# Patient Record
Sex: FEMALE | Race: WHITE | Employment: OTHER | URBAN - METROPOLITAN AREA
[De-identification: names, ages, dates, MRNs, and addresses within clinical notes are randomized per-mention and may not be internally consistent; named-entity substitution may affect disease eponyms.]

---

## 2022-02-07 ENCOUNTER — HOSPITAL ENCOUNTER (OUTPATIENT)
Dept: PHYSICAL THERAPY | Facility: REHABILITATION | Age: 57
End: 2022-02-07
Payer: COMMERCIAL

## 2022-02-07 DIAGNOSIS — M79.18 MYOFASCIAL PAIN: Primary | ICD-10-CM

## 2022-02-07 DIAGNOSIS — M54.2 CERVICAL PAIN: ICD-10-CM

## 2022-02-07 DIAGNOSIS — M54.6 THORACIC SPINE PAIN: ICD-10-CM

## 2022-02-07 PROCEDURE — 97140 MANUAL THERAPY 1/> REGIONS: CPT | Mod: GP | Performed by: PHYSICAL THERAPIST

## 2022-02-07 PROCEDURE — 97163 PT EVAL HIGH COMPLEX 45 MIN: CPT | Mod: GP | Performed by: PHYSICAL THERAPIST

## 2022-02-07 PROCEDURE — 97110 THERAPEUTIC EXERCISES: CPT | Mod: GP | Performed by: PHYSICAL THERAPIST

## 2022-02-24 ENCOUNTER — HOSPITAL ENCOUNTER (OUTPATIENT)
Dept: PHYSICAL THERAPY | Facility: REHABILITATION | Age: 57
End: 2022-02-24
Payer: COMMERCIAL

## 2022-02-24 DIAGNOSIS — M54.6 THORACIC SPINE PAIN: ICD-10-CM

## 2022-02-24 DIAGNOSIS — M79.18 MYOFASCIAL PAIN: Primary | ICD-10-CM

## 2022-02-24 DIAGNOSIS — M54.2 CERVICAL PAIN: ICD-10-CM

## 2022-02-24 PROCEDURE — 97110 THERAPEUTIC EXERCISES: CPT | Mod: GP | Performed by: PHYSICAL THERAPIST

## 2022-02-24 PROCEDURE — 97140 MANUAL THERAPY 1/> REGIONS: CPT | Mod: GP | Performed by: PHYSICAL THERAPIST

## 2022-02-24 PROCEDURE — 97112 NEUROMUSCULAR REEDUCATION: CPT | Mod: GP | Performed by: PHYSICAL THERAPIST

## 2022-03-25 ENCOUNTER — HOSPITAL ENCOUNTER (OUTPATIENT)
Dept: PHYSICAL THERAPY | Facility: REHABILITATION | Age: 57
Discharge: HOME | End: 2022-03-25
Payer: COMMERCIAL

## 2022-03-25 DIAGNOSIS — M79.18 MYOFASCIAL PAIN: Primary | ICD-10-CM

## 2022-03-25 DIAGNOSIS — M54.6 THORACIC SPINE PAIN: ICD-10-CM

## 2022-03-25 DIAGNOSIS — M54.2 CERVICAL PAIN: ICD-10-CM

## 2022-03-25 PROCEDURE — 97140 MANUAL THERAPY 1/> REGIONS: CPT | Mod: GP | Performed by: PHYSICAL THERAPIST

## 2022-03-25 PROCEDURE — 97112 NEUROMUSCULAR REEDUCATION: CPT | Mod: GP | Performed by: PHYSICAL THERAPIST

## 2022-03-25 PROCEDURE — 97110 THERAPEUTIC EXERCISES: CPT | Mod: GP | Performed by: PHYSICAL THERAPIST

## 2022-04-08 ENCOUNTER — HOSPITAL ENCOUNTER (OUTPATIENT)
Dept: PHYSICAL THERAPY | Facility: REHABILITATION | Age: 57
Discharge: HOME | End: 2022-04-08
Payer: COMMERCIAL

## 2022-04-08 DIAGNOSIS — M54.6 THORACIC SPINE PAIN: ICD-10-CM

## 2022-04-08 DIAGNOSIS — M54.2 CERVICAL PAIN: ICD-10-CM

## 2022-04-08 DIAGNOSIS — M79.18 MYOFASCIAL PAIN: Primary | ICD-10-CM

## 2022-04-08 PROCEDURE — 97110 THERAPEUTIC EXERCISES: CPT | Mod: GP | Performed by: PHYSICAL THERAPIST

## 2022-04-08 PROCEDURE — 97112 NEUROMUSCULAR REEDUCATION: CPT | Mod: GP | Performed by: PHYSICAL THERAPIST

## 2022-04-08 PROCEDURE — 97140 MANUAL THERAPY 1/> REGIONS: CPT | Mod: GP | Performed by: PHYSICAL THERAPIST

## 2022-04-15 ENCOUNTER — HOSPITAL ENCOUNTER (OUTPATIENT)
Dept: PHYSICAL THERAPY | Facility: REHABILITATION | Age: 57
Discharge: HOME | End: 2022-04-15
Payer: COMMERCIAL

## 2022-04-15 DIAGNOSIS — M54.2 CERVICAL PAIN: ICD-10-CM

## 2022-04-15 DIAGNOSIS — M79.18 MYOFASCIAL PAIN: Primary | ICD-10-CM

## 2022-04-15 DIAGNOSIS — M54.6 THORACIC SPINE PAIN: ICD-10-CM

## 2022-04-15 PROCEDURE — 97140 MANUAL THERAPY 1/> REGIONS: CPT | Mod: GP | Performed by: PHYSICAL THERAPIST

## 2022-04-15 PROCEDURE — 97112 NEUROMUSCULAR REEDUCATION: CPT | Mod: GP | Performed by: PHYSICAL THERAPIST

## 2022-04-15 PROCEDURE — 97110 THERAPEUTIC EXERCISES: CPT | Mod: GP | Performed by: PHYSICAL THERAPIST

## 2022-04-21 ENCOUNTER — HOSPITAL ENCOUNTER (OUTPATIENT)
Dept: PHYSICAL THERAPY | Facility: REHABILITATION | Age: 57
Discharge: HOME | End: 2022-04-21
Payer: COMMERCIAL

## 2022-04-21 DIAGNOSIS — M54.2 CERVICAL PAIN: ICD-10-CM

## 2022-04-21 DIAGNOSIS — M54.6 THORACIC SPINE PAIN: ICD-10-CM

## 2022-04-21 DIAGNOSIS — M79.18 MYOFASCIAL PAIN: Primary | ICD-10-CM

## 2022-04-21 PROCEDURE — 97112 NEUROMUSCULAR REEDUCATION: CPT | Mod: GP | Performed by: PHYSICAL THERAPIST

## 2022-04-21 PROCEDURE — 97110 THERAPEUTIC EXERCISES: CPT | Mod: GP | Performed by: PHYSICAL THERAPIST

## 2022-04-21 PROCEDURE — 97140 MANUAL THERAPY 1/> REGIONS: CPT | Mod: GP | Performed by: PHYSICAL THERAPIST

## 2022-05-12 ENCOUNTER — HOSPITAL ENCOUNTER (OUTPATIENT)
Dept: PHYSICAL THERAPY | Facility: REHABILITATION | Age: 57
Discharge: HOME | End: 2022-05-12
Payer: COMMERCIAL

## 2022-05-12 DIAGNOSIS — M54.6 THORACIC SPINE PAIN: ICD-10-CM

## 2022-05-12 DIAGNOSIS — M54.2 CERVICAL PAIN: ICD-10-CM

## 2022-05-12 DIAGNOSIS — M79.18 MYOFASCIAL PAIN: Primary | ICD-10-CM

## 2022-05-12 PROCEDURE — 97112 NEUROMUSCULAR REEDUCATION: CPT | Mod: GP | Performed by: PHYSICAL THERAPIST

## 2022-05-12 PROCEDURE — 97110 THERAPEUTIC EXERCISES: CPT | Mod: GP | Performed by: PHYSICAL THERAPIST

## 2022-05-12 PROCEDURE — 97140 MANUAL THERAPY 1/> REGIONS: CPT | Mod: GP | Performed by: PHYSICAL THERAPIST

## 2022-05-26 ENCOUNTER — HOSPITAL ENCOUNTER (OUTPATIENT)
Dept: PHYSICAL THERAPY | Facility: REHABILITATION | Age: 57
Discharge: HOME | End: 2022-05-26
Payer: COMMERCIAL

## 2022-05-26 DIAGNOSIS — M54.6 THORACIC SPINE PAIN: ICD-10-CM

## 2022-05-26 DIAGNOSIS — M79.18 MYOFASCIAL PAIN: Primary | ICD-10-CM

## 2022-05-26 DIAGNOSIS — M54.2 CERVICAL PAIN: ICD-10-CM

## 2022-05-26 PROCEDURE — 97112 NEUROMUSCULAR REEDUCATION: CPT | Mod: GP | Performed by: PHYSICAL THERAPIST

## 2022-05-26 PROCEDURE — 97140 MANUAL THERAPY 1/> REGIONS: CPT | Mod: GP | Performed by: PHYSICAL THERAPIST

## 2022-05-26 PROCEDURE — 97110 THERAPEUTIC EXERCISES: CPT | Mod: GP | Performed by: PHYSICAL THERAPIST

## 2022-06-08 NOTE — ADDENDUM NOTE
Encounter addended by: Jordan Fitzgerald, PT on: 6/8/2022 3:24 PM   Actions taken: Clinical Note Signed, Flowsheet accepted, Document created, Document edited
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
## 2022-06-10 ENCOUNTER — HOSPITAL ENCOUNTER (OUTPATIENT)
Dept: PHYSICAL THERAPY | Facility: REHABILITATION | Age: 57
Discharge: HOME | End: 2022-06-10
Payer: COMMERCIAL

## 2022-06-10 DIAGNOSIS — M54.2 CERVICAL PAIN: ICD-10-CM

## 2022-06-10 DIAGNOSIS — M54.6 THORACIC SPINE PAIN: ICD-10-CM

## 2022-06-10 DIAGNOSIS — M79.18 MYOFASCIAL PAIN: Primary | ICD-10-CM

## 2022-06-10 PROCEDURE — 97110 THERAPEUTIC EXERCISES: CPT | Mod: GP | Performed by: PHYSICAL THERAPIST

## 2022-06-10 PROCEDURE — 97140 MANUAL THERAPY 1/> REGIONS: CPT | Mod: GP | Performed by: PHYSICAL THERAPIST

## 2022-06-10 PROCEDURE — 97112 NEUROMUSCULAR REEDUCATION: CPT | Mod: GP | Performed by: PHYSICAL THERAPIST

## 2022-06-13 NOTE — PROGRESS NOTES
[facility]    OUTPATIENT PHYSICAL THERAPY  PLAN OF TREATMENT FOR OUTPATIENT REHABILITATION AND PROGRESS NOTE           Patient's Last Name, First Name, Francisca Osullivan Date of Birth  1965   Provider's Name  [facility] Medical Record No.  [id number]    Onset Date  12/9/21 Start of Care Date  2/7/22   Type:     _X_PT   ___OT   ___SLP Medical Diagnosis  Chronic pain/post concussive   PT Diagnosis  Post concussive/chronic pain Plan of Treatment  Frequency/Duration: 1x/week  Certification date from 5/7/22 to 8/6/22     Goals:  Goal Identifier 1   Goal Description Pt will be able to improve C-rot to >50 deg to improve daily function in 12 weeks.    Target Date 05/09/22   Date Met      Progress (detail required for progress note): C-rot: 62/64     Goal Identifier 2   Goal Description Pt will decrease NDI by >10 points to show improved function in 12 weeks.    Target Date 05/09/22   Date Met      Progress (detail required for progress note):       Goal Identifier 3   Goal Description Pt will decrease pain levels from 8-10/10 to 6-8/10 with ADLs to show improved function in 12 weeks.    Target Date 05/09/22   Date Met      Progress (detail required for progress note): Pain range: 6-8/10 lately     Goal Identifier     Goal Description     Target Date     Date Met      Progress (detail required for progress note):       Goal Identifier     Goal Description     Target Date     Date Met      Progress (detail required for progress note):       Goal Identifier     Goal Description     Target Date     Date Met      Progress (detail required for progress note):       Goal Identifier     Goal Description     Target Date     Date Met      Progress (detail required for progress note):       Goal Identifier     Goal Description     Target Date     Date Met      Progress  (detail required for progress note):                   I CERTIFY THE NEED FOR THESE SERVICES FURNISHED UNDER        THIS PLAN OF TREATMENT AND WHILE UNDER MY CARE .             Physician Signature               Date    X_____________________________________________________                  Referring Provider: Christal Levy, CNP    CLAUDIA ODEN, PT         06/10/22 1400   Signing Clinician's Name / Credentials   Signing clinician's name / credentials Claudia Oden PT   Session Number   Session Number 9   Adult Goals   PT Ortho Eval Goals 1;2;3   Ortho Goal 1   Goal Identifier 1   Goal Description Pt will be able to improve C-rot to >50 deg to improve daily function in 12 weeks.    Goal Progress C-rot: 62/64   Target Date 05/09/22   Ortho Goal 2   Goal Identifier 2   Goal Description Pt will decrease NDI by >10 points to show improved function in 12 weeks.    Target Date 05/09/22   Ortho Goal 3   Goal Identifier 3   Goal Description Pt will decrease pain levels from 8-10/10 to 6-8/10 with ADLs to show improved function in 12 weeks.    Goal Progress Pain range: 6-8/10 lately   Target Date 05/09/22   Subjective Report   Subjective Report She has noticed that she isn't burping like she used to after drinking. That is very exciting for her.  She does notice that she was rubbing her lower ribs/upper abdomen due to maybe having more stress with a recent client's death. She did have some coconut sugar as well which really made her not feel good.   SHe did notcie that she was able to move her neck much easier after the last treatment.   Objective Measures   Objective Measures Objective Measure 1   Objective Measure 1   Objective Measure Art, LV, neural fascial tensions.   Treatment Interventions   Interventions Therapeutic Procedure/Exercise;Neuromuscular Re-education;Manual Therapy   Therapeutic Procedure/exercise   Therapeutic Procedures: strength, endurance, ROM, flexibillity minutes (69712) 15   Skilled  Intervention AA/PROM to BLE, CTL spine, ribs, cranium   Neuromuscular Re-education   Neuromuscular re-ed of mvmt, balance, coord, kinesthetic sense, posture, proprioception minutes (56639) 15   Skilled Intervention Recip inhib of neural, LV, art fasica   Manual Therapy   Manual Therapy: Mobilization, MFR, MLD, friction massage minutes (84992) 25   Skilled Intervention Fascial release using Strain-Counterstrain of RLE-A, B cervical-A, B cervical-AST, B lumbar sulcal-LV   Plan   Plan for next session Plan to con't with manual therapy to decrease fascial tension/tone to normalize ROM/decrease inflammation/decrease mm tone and improve proprioception.     Comments   Comments She has progressed nicely thus far. Treatments have been well received and she is progressing nicely towards all goals. Today she was able to demo much improved neck sidebending post treatment. She remains appropriate for skilled PT to reach all stated goals.   Total Session Time   Timed Code Treatment Minutes 55   Total Treatment Time (sum of timed and untimed services) 55   AMBULATORY CLINICS ONLY-MEDICAL AND TREATMENT DIAGNOSIS   PT Diagnosis chronic pain, chronic neck pain

## 2022-06-24 ENCOUNTER — HOSPITAL ENCOUNTER (OUTPATIENT)
Dept: PHYSICAL THERAPY | Facility: REHABILITATION | Age: 57
Discharge: HOME | End: 2022-06-24
Payer: COMMERCIAL

## 2022-06-24 DIAGNOSIS — M79.18 MYOFASCIAL PAIN: Primary | ICD-10-CM

## 2022-06-24 DIAGNOSIS — M54.2 CERVICAL PAIN: ICD-10-CM

## 2022-06-24 DIAGNOSIS — M54.6 THORACIC SPINE PAIN: ICD-10-CM

## 2022-06-24 PROCEDURE — 97110 THERAPEUTIC EXERCISES: CPT | Mod: GP | Performed by: PHYSICAL THERAPIST

## 2022-06-24 PROCEDURE — 97112 NEUROMUSCULAR REEDUCATION: CPT | Mod: GP | Performed by: PHYSICAL THERAPIST

## 2022-06-24 PROCEDURE — 97140 MANUAL THERAPY 1/> REGIONS: CPT | Mod: GP | Performed by: PHYSICAL THERAPIST

## 2022-07-18 ENCOUNTER — HOSPITAL ENCOUNTER (OUTPATIENT)
Dept: PHYSICAL THERAPY | Facility: REHABILITATION | Age: 57
Discharge: HOME | End: 2022-07-18
Payer: COMMERCIAL

## 2022-07-18 DIAGNOSIS — M54.6 THORACIC SPINE PAIN: ICD-10-CM

## 2022-07-18 DIAGNOSIS — M54.2 CERVICAL PAIN: ICD-10-CM

## 2022-07-18 DIAGNOSIS — M79.18 MYOFASCIAL PAIN: Primary | ICD-10-CM

## 2022-07-18 PROCEDURE — 97112 NEUROMUSCULAR REEDUCATION: CPT | Mod: GP | Performed by: PHYSICAL THERAPIST

## 2022-07-18 PROCEDURE — 97140 MANUAL THERAPY 1/> REGIONS: CPT | Mod: GP | Performed by: PHYSICAL THERAPIST

## 2022-07-18 PROCEDURE — 97110 THERAPEUTIC EXERCISES: CPT | Mod: GP | Performed by: PHYSICAL THERAPIST

## 2022-08-02 ENCOUNTER — HOSPITAL ENCOUNTER (OUTPATIENT)
Dept: PHYSICAL THERAPY | Facility: REHABILITATION | Age: 57
Discharge: HOME | End: 2022-08-02
Payer: COMMERCIAL

## 2022-08-02 DIAGNOSIS — M79.18 MYOFASCIAL PAIN: Primary | ICD-10-CM

## 2022-08-02 DIAGNOSIS — M54.2 CERVICAL PAIN: ICD-10-CM

## 2022-08-02 DIAGNOSIS — M54.6 THORACIC SPINE PAIN: ICD-10-CM

## 2022-08-02 PROCEDURE — 97140 MANUAL THERAPY 1/> REGIONS: CPT | Mod: GP | Performed by: PHYSICAL THERAPIST

## 2022-08-02 PROCEDURE — 97110 THERAPEUTIC EXERCISES: CPT | Mod: GP | Performed by: PHYSICAL THERAPIST

## 2022-08-02 PROCEDURE — 97112 NEUROMUSCULAR REEDUCATION: CPT | Mod: GP | Performed by: PHYSICAL THERAPIST
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
## 2022-08-15 ENCOUNTER — HOSPITAL ENCOUNTER (OUTPATIENT)
Dept: PHYSICAL THERAPY | Facility: REHABILITATION | Age: 57
Discharge: HOME | End: 2022-08-15
Payer: COMMERCIAL

## 2022-08-15 DIAGNOSIS — M54.2 CERVICAL PAIN: ICD-10-CM

## 2022-08-15 DIAGNOSIS — M79.18 MYOFASCIAL PAIN: Primary | ICD-10-CM

## 2022-08-15 DIAGNOSIS — M54.6 THORACIC SPINE PAIN: ICD-10-CM

## 2022-08-15 PROCEDURE — 97112 NEUROMUSCULAR REEDUCATION: CPT | Mod: GP | Performed by: PHYSICAL THERAPIST

## 2022-08-15 PROCEDURE — 97140 MANUAL THERAPY 1/> REGIONS: CPT | Mod: GP | Performed by: PHYSICAL THERAPIST

## 2022-08-15 PROCEDURE — 97110 THERAPEUTIC EXERCISES: CPT | Mod: GP | Performed by: PHYSICAL THERAPIST

## 2022-08-30 ENCOUNTER — HOSPITAL ENCOUNTER (OUTPATIENT)
Dept: PHYSICAL THERAPY | Facility: REHABILITATION | Age: 57
Discharge: HOME | End: 2022-08-30
Payer: COMMERCIAL

## 2022-08-30 DIAGNOSIS — M54.6 THORACIC SPINE PAIN: ICD-10-CM

## 2022-08-30 DIAGNOSIS — M54.2 CERVICAL PAIN: ICD-10-CM

## 2022-08-30 DIAGNOSIS — M79.18 MYOFASCIAL PAIN: Primary | ICD-10-CM

## 2022-08-30 PROCEDURE — 97140 MANUAL THERAPY 1/> REGIONS: CPT | Mod: GP | Performed by: PHYSICAL THERAPIST

## 2022-08-30 PROCEDURE — 97110 THERAPEUTIC EXERCISES: CPT | Mod: GP | Performed by: PHYSICAL THERAPIST

## 2022-08-30 PROCEDURE — 97112 NEUROMUSCULAR REEDUCATION: CPT | Mod: GP | Performed by: PHYSICAL THERAPIST

## 2022-09-09 ENCOUNTER — HOSPITAL ENCOUNTER (OUTPATIENT)
Dept: PHYSICAL THERAPY | Facility: REHABILITATION | Age: 57
Discharge: HOME | End: 2022-09-09
Payer: COMMERCIAL

## 2022-09-09 DIAGNOSIS — M79.18 MYOFASCIAL PAIN: Primary | ICD-10-CM

## 2022-09-09 DIAGNOSIS — M54.2 CERVICAL PAIN: ICD-10-CM

## 2022-09-09 DIAGNOSIS — M54.6 THORACIC SPINE PAIN: ICD-10-CM

## 2022-09-09 PROCEDURE — 97140 MANUAL THERAPY 1/> REGIONS: CPT | Mod: GP | Performed by: PHYSICAL THERAPIST

## 2022-09-09 PROCEDURE — 97112 NEUROMUSCULAR REEDUCATION: CPT | Mod: GP | Performed by: PHYSICAL THERAPIST

## 2022-09-09 PROCEDURE — 97110 THERAPEUTIC EXERCISES: CPT | Mod: GP | Performed by: PHYSICAL THERAPIST

## 2022-09-29 ENCOUNTER — HOSPITAL ENCOUNTER (OUTPATIENT)
Dept: PHYSICAL THERAPY | Facility: REHABILITATION | Age: 57
Discharge: HOME | End: 2022-09-29
Payer: COMMERCIAL

## 2022-09-29 DIAGNOSIS — M79.18 MYOFASCIAL PAIN: Primary | ICD-10-CM

## 2022-09-29 DIAGNOSIS — M54.6 THORACIC SPINE PAIN: ICD-10-CM

## 2022-09-29 DIAGNOSIS — M54.2 CERVICAL PAIN: ICD-10-CM

## 2022-09-29 PROCEDURE — 97140 MANUAL THERAPY 1/> REGIONS: CPT | Mod: GP | Performed by: PHYSICAL THERAPIST

## 2022-09-29 PROCEDURE — 97110 THERAPEUTIC EXERCISES: CPT | Mod: GP | Performed by: PHYSICAL THERAPIST

## 2022-09-29 PROCEDURE — 97112 NEUROMUSCULAR REEDUCATION: CPT | Mod: GP | Performed by: PHYSICAL THERAPIST

## 2022-10-10 ENCOUNTER — HOSPITAL ENCOUNTER (OUTPATIENT)
Dept: PHYSICAL THERAPY | Facility: REHABILITATION | Age: 57
Discharge: HOME | End: 2022-10-10
Payer: COMMERCIAL

## 2022-10-10 DIAGNOSIS — M79.18 MYOFASCIAL PAIN: Primary | ICD-10-CM

## 2022-10-10 DIAGNOSIS — M54.2 CERVICAL PAIN: ICD-10-CM

## 2022-10-10 DIAGNOSIS — M54.6 THORACIC SPINE PAIN: ICD-10-CM

## 2022-10-10 PROCEDURE — 97110 THERAPEUTIC EXERCISES: CPT | Mod: GP | Performed by: PHYSICAL THERAPIST

## 2022-10-10 PROCEDURE — 97112 NEUROMUSCULAR REEDUCATION: CPT | Mod: GP | Performed by: PHYSICAL THERAPIST

## 2022-10-10 PROCEDURE — 97140 MANUAL THERAPY 1/> REGIONS: CPT | Mod: GP | Performed by: PHYSICAL THERAPIST

## 2022-10-25 ENCOUNTER — HOSPITAL ENCOUNTER (OUTPATIENT)
Dept: PHYSICAL THERAPY | Facility: REHABILITATION | Age: 57
Discharge: HOME | End: 2022-10-25
Payer: COMMERCIAL

## 2022-10-25 DIAGNOSIS — M79.18 MYOFASCIAL PAIN: Primary | ICD-10-CM

## 2022-10-25 DIAGNOSIS — M54.2 CERVICAL PAIN: ICD-10-CM

## 2022-10-25 DIAGNOSIS — M54.6 THORACIC SPINE PAIN: ICD-10-CM

## 2022-10-25 PROCEDURE — 97112 NEUROMUSCULAR REEDUCATION: CPT | Mod: GP | Performed by: PHYSICAL THERAPIST

## 2022-10-25 PROCEDURE — 97110 THERAPEUTIC EXERCISES: CPT | Mod: GP | Performed by: PHYSICAL THERAPIST

## 2022-10-25 PROCEDURE — 97140 MANUAL THERAPY 1/> REGIONS: CPT | Mod: GP | Performed by: PHYSICAL THERAPIST

## 2022-10-28 ENCOUNTER — HOSPITAL ENCOUNTER (OUTPATIENT)
Dept: PHYSICAL THERAPY | Facility: REHABILITATION | Age: 57
Discharge: HOME | End: 2022-10-28
Payer: COMMERCIAL

## 2022-10-28 DIAGNOSIS — M54.6 THORACIC SPINE PAIN: ICD-10-CM

## 2022-10-28 DIAGNOSIS — M79.18 MYOFASCIAL PAIN: Primary | ICD-10-CM

## 2022-10-28 DIAGNOSIS — M54.2 CERVICAL PAIN: ICD-10-CM

## 2022-10-28 PROCEDURE — 97140 MANUAL THERAPY 1/> REGIONS: CPT | Mod: GP | Performed by: PHYSICAL THERAPIST

## 2022-10-28 PROCEDURE — 97110 THERAPEUTIC EXERCISES: CPT | Mod: GP | Performed by: PHYSICAL THERAPIST

## 2022-10-28 NOTE — PROGRESS NOTES
[facility]   Postural Restoration Assessment    Patient Name: Francisca Batista  Date of assessment: 10/28/2022               Objective:     Note: Items left blank indicates the item was not performed or not indicated at the time of the assessment.         ROM  Date: 10/28/22      PROM in degrees PROM in degrees PROM in degrees    Right Left Right Left Right Left   Shoulder Internal Rotation 55 73                Hip External Rotation (0-50 )         Hip Internal Rotation (0-40 )                        Lower Extremity Strength:    Date: 10/28/22     LE strength/5 Right Left Right Left Right Left   Hip Flexion (L1-3) 5 5       Hip Extension (L5-S1)         Hip Abduction (L4-5)         Hip Adduction (L2-3)         Hip External Rotation         Hip Internal Rotation         Knee Extension (L3-4) 4+ 4+       Knee Flexion 4- 3+       Ankle Dorsiflexion (L4-5)         Great Toe Extension (L5)         Ankle Plantar flexion (S1)         Abdominals               Special Tests:    Special Tests Right Left        Hruska Add drop test   - +   Hruska Add lift test        Trey Extension Drop Test               Treatment Today       Date 10/28/22   Exercise    Supine 90/90 hip lift with hip shift B feet on box with cues for 3 breaths x 3 reps                                                        Savita Hernandez, PT  10/28/2022  11:19 AM

## 2022-11-10 ENCOUNTER — HOSPITAL ENCOUNTER (OUTPATIENT)
Dept: PHYSICAL THERAPY | Facility: REHABILITATION | Age: 57
Discharge: HOME | End: 2022-11-10
Payer: COMMERCIAL

## 2022-11-10 ENCOUNTER — TRANSFERRED RECORDS (OUTPATIENT)
Dept: HEALTH INFORMATION MANAGEMENT | Facility: CLINIC | Age: 57
End: 2022-11-10

## 2022-11-10 DIAGNOSIS — M79.18 MYOFASCIAL PAIN: Primary | ICD-10-CM

## 2022-11-10 DIAGNOSIS — M54.2 CERVICAL PAIN: ICD-10-CM

## 2022-11-10 DIAGNOSIS — M54.6 THORACIC SPINE PAIN: ICD-10-CM

## 2022-11-10 PROCEDURE — 97140 MANUAL THERAPY 1/> REGIONS: CPT | Mod: GP | Performed by: PHYSICAL THERAPIST

## 2022-11-10 NOTE — PROGRESS NOTES
[facility]    OUTPATIENT PHYSICAL THERAPY  PLAN OF TREATMENT FOR OUTPATIENT REHABILITATION AND PROGRESS NOTE           Patient's Last Name, First Name, Francisca Osullivan Date of Birth  1965   Provider's Name  [facility] Medical Record No.  [id number]    Onset Date  12/9/21 Start of Care Date  2/7/22   Type:     _X_PT   ___OT   ___SLP Medical Diagnosis  Chronic pain   PT Diagnosis  Chronic pain Plan of Treatment  Frequency/Duration: 1x/week  Certification date from 11/1/22 to 1/31/23     Goals:  Goal Identifier 1   Goal Description Pt will be able to improve C-rot to >50 deg to improve daily function in 12 weeks.    Target Date     Date Met      Progress (detail required for progress note): C-rot: 60/70 - GOAL MET. Will inrease to >65 deg B     Goal Identifier 2   Goal Description Pt will decrease NDI by >10 points to show improved function in 12 weeks.    Target Date     Date Met      Progress (detail required for progress note): (P) NDI: 58 on 11/10/22(8/2/22: 58) (eval 74 on 2/7/22) GOAL MET and goal will now continue with new score. .     Goal Identifier 3   Goal Description Pt will decrease pain levels from 8-10/10 to 6-8/10 with ADLs to show improved function in 12 weeks.    Target Date     Date Met      Progress (detail required for progress note): Pain range: 7-9/10 in the hips, 6-8/10 with HA but more 6's.     Goal Identifier     Goal Description     Target Date     Date Met      Progress (detail required for progress note):       Goal Identifier     Goal Description     Target Date     Date Met      Progress (detail required for progress note):       Goal Identifier     Goal Description     Target Date     Date Met      Progress (detail required for progress note):       Goal Identifier     Goal Description     Target Date     Date Met     "  Progress (detail required for progress note):       Goal Identifier     Goal Description     Target Date     Date Met      Progress (detail required for progress note):                 I CERTIFY THE NEED FOR THESE SERVICES FURNISHED UNDER        THIS PLAN OF TREATMENT AND WHILE UNDER MY CARE     (Physician co-signature of this document indicates review and certification of the therapy plan).              Referring Provider: Christal Pop, MONTY ODEN, PT         11/10/22 1000   Signing Clinician's Name / Credentials   Signing clinician's name / credentials Jordan Oden PT   Session Number   Session Number 20   Progress Note/Recertification   Recertification Due Date 01/31/23   Adult Goals   PT Ortho Eval Goals 1;2;3   Ortho Goal 1   Goal Identifier 1   Goal Description Pt will be able to improve C-rot to >50 deg to improve daily function in 12 weeks.    Goal Progress C-rot: 60/70 - GOAL MET. Will inrease to >65 deg B   Ortho Goal 2   Goal Identifier 2   Goal Description Pt will decrease NDI by >10 points to show improved function in 12 weeks.    Goal Progress NDI: 58 on 11/10/22(8/2/22: 58) (eval 74 on 2/7/22) GOAL MET and goal will now continue with new score. .   Ortho Goal 3   Goal Identifier 3   Goal Description Pt will decrease pain levels from 8-10/10 to 6-8/10 with ADLs to show improved function in 12 weeks.    Goal Progress Pain range: 7-9/10 in the hips, 6-8/10 with HA but more 6's.   Subjective Report   Subjective Report She has been tired lately but did get good sleep last night. She has been doing her HEP at home. There have been some lower leg cramps. She is still doing her NDBC testing and is going to do some more treatments/referrals. Pain range: 6-8/10 in her head which is really calmed down for her. There is still the \"dizzy\" piece that is still there. With her hip pain she does feel like her foot has been really inverted more which can give her some pain on the outside of her " foot. She has really felt like she is doing much better with PT to this point and finds it really helpful.   Objective Measures   Objective Measures Objective Measure 1   Objective Measure 1   Objective Measure LV, neural fascial tensions.   Treatment Interventions   Interventions Therapeutic Procedure/Exercise;Neuromuscular Re-education;Manual Therapy   Manual Therapy   Manual Therapy: Mobilization, MFR, MLD, friction massage minutes (61900) 55   Skilled Intervention Fascial release using Strain-Counterstrain of B eye myochain-MS, B orbit/sphenoid (P)-MS, L foot/ankle (P)-MS   Plan   Plan for next session Continue with [facility] when available. Plan to con't with manual therapy to decrease fascial tension/tone to normalize ROM/decrease inflammation/decrease mm tone and improve proprioception.   Comments   Comments She has progressed towards all goals. Overall she has done much better and is starting to progress her HEP as she is tolerating. She does continue to be appropriate for skilled PT to reach all stated goals.   Total Session Time   Timed Code Treatment Minutes 55   Total Treatment Time (sum of timed and untimed services) 55   Medicare Claim Information   PT Diagnosis chronic pain, chronic neck pain   Onset date of current episode/exacerbation 12/09/21   Certification date from 01/31/23

## 2022-11-18 ENCOUNTER — HOSPITAL ENCOUNTER (OUTPATIENT)
Dept: PHYSICAL THERAPY | Facility: REHABILITATION | Age: 57
Discharge: HOME | End: 2022-11-18
Payer: COMMERCIAL

## 2022-11-18 DIAGNOSIS — M54.2 CERVICAL PAIN: ICD-10-CM

## 2022-11-18 DIAGNOSIS — M79.18 MYOFASCIAL PAIN: Primary | ICD-10-CM

## 2022-11-18 DIAGNOSIS — M54.6 THORACIC SPINE PAIN: ICD-10-CM

## 2022-11-18 PROCEDURE — 97140 MANUAL THERAPY 1/> REGIONS: CPT | Mod: GP | Performed by: PHYSICAL THERAPIST

## 2022-11-18 PROCEDURE — 97110 THERAPEUTIC EXERCISES: CPT | Mod: GP | Performed by: PHYSICAL THERAPIST

## 2022-11-18 NOTE — PROGRESS NOTES
[facility]     * For full daily note please see Daily Doc Flow sheet    Patient Name: Francisca Batista                 Objective:     Note: Items left blank indicates the item was not performed or not indicated at the time of the assessment.         ROM  Date: 10/28/22 11/18/22     PROM in degrees PROM in degrees PROM in degrees    Right Left Right Left Right Left   Shoulder Internal Rotation 55 73 55 90               Hip External Rotation (0-50 )         Hip Internal Rotation (0-40 )                        Lower Extremity Strength:    Date: 10/28/22     LE strength/5 Right Left Right Left Right Left   Hip Flexion (L1-3) 5 5       Hip Extension (L5-S1)         Hip Abduction (L4-5)         Hip Adduction (L2-3)         Hip External Rotation         Hip Internal Rotation         Knee Extension (L3-4) 4+ 4+       Knee Flexion 4- 3+       Ankle Dorsiflexion (L4-5)         Great Toe Extension (L5)         Ankle Plantar flexion (S1)         Abdominals               Special Tests:    Special Tests Right Left        Hruska Add drop test   - +   Hruska Add lift test        Trey Extension Drop Test               Treatment Today       Date 11/18/22 10/28/22   Exercise     Supine 90/90 hip lift with hip shift B feet on box and wall with cues 3 breaths x 3 reps B feet on box with cues for 3 breaths x 3 reps   R S/L L Adductor Pullback  3  Breaths x 3 reps with cues                                                               Savita Hernandez, PT  11/18/22

## 2022-11-22 ENCOUNTER — HOSPITAL ENCOUNTER (OUTPATIENT)
Dept: PHYSICAL THERAPY | Facility: REHABILITATION | Age: 57
Discharge: HOME | End: 2022-11-22
Payer: COMMERCIAL

## 2022-11-22 DIAGNOSIS — M54.6 THORACIC SPINE PAIN: ICD-10-CM

## 2022-11-22 DIAGNOSIS — M54.2 CERVICAL PAIN: ICD-10-CM

## 2022-11-22 DIAGNOSIS — M79.18 MYOFASCIAL PAIN: Primary | ICD-10-CM

## 2022-11-22 PROCEDURE — 97140 MANUAL THERAPY 1/> REGIONS: CPT | Mod: GP | Performed by: PHYSICAL THERAPIST

## 2022-11-22 PROCEDURE — 97110 THERAPEUTIC EXERCISES: CPT | Mod: GP | Performed by: PHYSICAL THERAPIST

## 2022-12-02 ENCOUNTER — HOSPITAL ENCOUNTER (OUTPATIENT)
Dept: PHYSICAL THERAPY | Facility: REHABILITATION | Age: 57
Discharge: HOME | End: 2022-12-02
Payer: COMMERCIAL

## 2022-12-02 DIAGNOSIS — M54.2 CERVICAL PAIN: ICD-10-CM

## 2022-12-02 DIAGNOSIS — M79.18 MYOFASCIAL PAIN: Primary | ICD-10-CM

## 2022-12-02 DIAGNOSIS — M54.6 THORACIC SPINE PAIN: ICD-10-CM

## 2022-12-02 PROCEDURE — 97140 MANUAL THERAPY 1/> REGIONS: CPT | Mod: GP | Performed by: PHYSICAL THERAPIST

## 2022-12-02 PROCEDURE — 97110 THERAPEUTIC EXERCISES: CPT | Mod: GP | Performed by: PHYSICAL THERAPIST

## 2022-12-02 NOTE — PROGRESS NOTES
[facility]     * For full daily note please see Daily Doc Flow sheet    12/2/22  Patient Name: Francisca Batista                 Objective:     Note: Items left blank indicates the item was not performed or not indicated at the time of the assessment.         ROM  Date: 10/28/22 11/18/22     PROM in degrees PROM in degrees PROM in degrees    Right Left Right Left Right Left   Shoulder Internal Rotation 55 73 55 90               Hip External Rotation (0-50 )         Hip Internal Rotation (0-40 )                        Lower Extremity Strength:    Date: 10/28/22     LE strength/5 Right Left Right Left Right Left   Hip Flexion (L1-3) 5 5       Hip Extension (L5-S1)         Hip Abduction (L4-5)         Hip Adduction (L2-3)         Hip External Rotation         Hip Internal Rotation         Knee Extension (L3-4) 4+ 4+       Knee Flexion 4- 3+       Ankle Dorsiflexion (L4-5)         Great Toe Extension (L5)         Ankle Plantar flexion (S1)         Abdominals               Special Tests:    Special Tests Right Left        Hruska Add drop test   - +   Hruska Add lift test        Trey Extension Drop Test               Treatment Today       Date 12/2/22 11/18/22 10/28/22   Exercise      Supine 90/90 hip lift with hip shift B feet on box and wall with cues 3 breaths x 3 reps  B feet on box and wall with cues 3 breaths x 3 reps B feet on box with cues for 3 breaths x 3 reps   R S/L L Adductor Pullback  With yellow band 3 breaths x 5 with cues  3  Breaths x 3 reps with cues                                                                         Savita Hernandez, PT

## 2022-12-05 ENCOUNTER — HOSPITAL ENCOUNTER (OUTPATIENT)
Dept: PHYSICAL THERAPY | Facility: REHABILITATION | Age: 57
Discharge: HOME | End: 2022-12-05
Payer: COMMERCIAL

## 2022-12-05 DIAGNOSIS — M54.6 THORACIC SPINE PAIN: ICD-10-CM

## 2022-12-05 DIAGNOSIS — M79.18 MYOFASCIAL PAIN: Primary | ICD-10-CM

## 2022-12-05 DIAGNOSIS — M54.2 CERVICAL PAIN: ICD-10-CM

## 2022-12-05 PROCEDURE — 97110 THERAPEUTIC EXERCISES: CPT | Mod: GP | Performed by: PHYSICAL THERAPIST

## 2022-12-05 PROCEDURE — 97140 MANUAL THERAPY 1/> REGIONS: CPT | Mod: GP | Performed by: PHYSICAL THERAPIST

## 2022-12-19 ENCOUNTER — HOSPITAL ENCOUNTER (OUTPATIENT)
Dept: PHYSICAL THERAPY | Facility: REHABILITATION | Age: 57
Discharge: HOME | End: 2022-12-19
Payer: COMMERCIAL

## 2022-12-19 DIAGNOSIS — M79.18 MYOFASCIAL PAIN: Primary | ICD-10-CM

## 2022-12-19 DIAGNOSIS — M54.6 THORACIC SPINE PAIN: ICD-10-CM

## 2022-12-19 DIAGNOSIS — M54.2 CERVICAL PAIN: ICD-10-CM

## 2022-12-19 PROCEDURE — 97110 THERAPEUTIC EXERCISES: CPT | Mod: GP | Performed by: PHYSICAL THERAPIST

## 2022-12-19 PROCEDURE — 97140 MANUAL THERAPY 1/> REGIONS: CPT | Mod: GP | Performed by: PHYSICAL THERAPIST

## 2023-01-02 ENCOUNTER — HOSPITAL ENCOUNTER (OUTPATIENT)
Dept: PHYSICAL THERAPY | Facility: REHABILITATION | Age: 58
Discharge: HOME | End: 2023-01-02
Payer: COMMERCIAL

## 2023-01-02 DIAGNOSIS — M54.2 CERVICAL PAIN: ICD-10-CM

## 2023-01-02 DIAGNOSIS — M79.18 MYOFASCIAL PAIN: Primary | ICD-10-CM

## 2023-01-02 DIAGNOSIS — M54.6 THORACIC SPINE PAIN: ICD-10-CM

## 2023-01-02 PROCEDURE — 97140 MANUAL THERAPY 1/> REGIONS: CPT | Mod: GP | Performed by: PHYSICAL THERAPIST

## 2023-01-02 PROCEDURE — 97110 THERAPEUTIC EXERCISES: CPT | Mod: GP | Performed by: PHYSICAL THERAPIST

## 2023-01-06 ENCOUNTER — HOSPITAL ENCOUNTER (OUTPATIENT)
Dept: PHYSICAL THERAPY | Facility: REHABILITATION | Age: 58
Discharge: HOME | End: 2023-01-06
Payer: COMMERCIAL

## 2023-01-06 DIAGNOSIS — M54.6 THORACIC SPINE PAIN: ICD-10-CM

## 2023-01-06 DIAGNOSIS — M79.18 MYOFASCIAL PAIN: Primary | ICD-10-CM

## 2023-01-06 DIAGNOSIS — M54.2 CERVICAL PAIN: ICD-10-CM

## 2023-01-06 PROCEDURE — 97110 THERAPEUTIC EXERCISES: CPT | Mod: GP | Performed by: PHYSICAL THERAPIST

## 2023-01-06 PROCEDURE — 97140 MANUAL THERAPY 1/> REGIONS: CPT | Mod: GP | Performed by: PHYSICAL THERAPIST

## 2023-01-07 NOTE — PROGRESS NOTES
[facility]     * For full daily note please see Daily Doc Flow sheet    Patient Name: Francisca Batista                 Objective:     Note: Items left blank indicates the item was not performed or not indicated at the time of the assessment.         ROM  Date: 10/28/22 11/18/22     PROM in degrees PROM in degrees PROM in degrees    Right Left Right Left Right Left   Shoulder Internal Rotation 55 73 55 90               Hip External Rotation (0-50 )         Hip Internal Rotation (0-40 )                        Lower Extremity Strength:    Date: 10/28/22     LE strength/5 Right Left Right Left Right Left   Hip Flexion (L1-3) 5 5       Hip Extension (L5-S1)         Hip Abduction (L4-5)         Hip Adduction (L2-3)         Hip External Rotation         Hip Internal Rotation         Knee Extension (L3-4) 4+ 4+       Knee Flexion 4- 3+       Ankle Dorsiflexion (L4-5)         Great Toe Extension (L5)         Ankle Plantar flexion (S1)         Abdominals               Special Tests:    Special Tests Right Left        Hruska Add drop test   - +   Hruska Add lift test        Trye Extension Drop Test               Treatment Today       Date 1/6/23 12/2/22 11/18/22 10/28/22   Exercise       Supine 90/90 hip lift with hip shift B feet on box with cues 3 breaths x 3  B feet on box and wall with cues 3 breaths x 3 reps  B feet on box and wall with cues 3 breaths x 3 reps B feet on box with cues for 3 breaths x 3 reps   R S/L L Adductor Pullback  With yellow band 3 breaths x 3 with cues, then without band. Pt starting to have proper muscle facilitation without the band.  With yellow band 3 breaths x 5 with cues  3  Breaths x 3 reps with cues                                                                                   Savita Hernandez, PT

## 2023-01-30 ENCOUNTER — HOSPITAL ENCOUNTER (OUTPATIENT)
Dept: PHYSICAL THERAPY | Facility: REHABILITATION | Age: 58
Discharge: HOME | End: 2023-01-30
Payer: COMMERCIAL

## 2023-01-30 DIAGNOSIS — M54.6 THORACIC SPINE PAIN: ICD-10-CM

## 2023-01-30 DIAGNOSIS — M54.2 CERVICAL PAIN: ICD-10-CM

## 2023-01-30 DIAGNOSIS — M79.18 MYOFASCIAL PAIN: Primary | ICD-10-CM

## 2023-01-30 PROCEDURE — 97140 MANUAL THERAPY 1/> REGIONS: CPT | Mod: GP | Performed by: PHYSICAL THERAPIST
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
## 2023-02-13 ENCOUNTER — HOSPITAL ENCOUNTER (OUTPATIENT)
Dept: PHYSICAL THERAPY | Facility: REHABILITATION | Age: 58
Discharge: HOME | End: 2023-02-13
Payer: COMMERCIAL

## 2023-02-13 DIAGNOSIS — M54.6 THORACIC SPINE PAIN: ICD-10-CM

## 2023-02-13 DIAGNOSIS — M79.18 MYOFASCIAL PAIN: Primary | ICD-10-CM

## 2023-02-13 DIAGNOSIS — M54.2 CERVICAL PAIN: ICD-10-CM

## 2023-02-13 PROCEDURE — 97140 MANUAL THERAPY 1/> REGIONS: CPT | Mod: GP | Performed by: PHYSICAL THERAPIST

## 2023-02-17 ENCOUNTER — HOSPITAL ENCOUNTER (OUTPATIENT)
Dept: PHYSICAL THERAPY | Facility: REHABILITATION | Age: 58
Discharge: HOME | End: 2023-02-17
Payer: COMMERCIAL

## 2023-02-17 DIAGNOSIS — M54.2 CERVICAL PAIN: ICD-10-CM

## 2023-02-17 DIAGNOSIS — M79.18 MYOFASCIAL PAIN: Primary | ICD-10-CM

## 2023-02-17 DIAGNOSIS — M54.6 THORACIC SPINE PAIN: ICD-10-CM

## 2023-02-17 PROCEDURE — 97110 THERAPEUTIC EXERCISES: CPT | Mod: GP | Performed by: PHYSICAL THERAPIST

## 2023-02-17 PROCEDURE — 97140 MANUAL THERAPY 1/> REGIONS: CPT | Mod: GP | Performed by: PHYSICAL THERAPIST

## 2023-02-17 NOTE — PROGRESS NOTES
[facility]     * For full daily note please see Daily Doc Flow sheet    Patient Name: Francisca Batista                 Objective:     Note: Items left blank indicates the item was not performed or not indicated at the time of the assessment.         ROM  Date: 10/28/22 11/18/22     PROM in degrees PROM in degrees PROM in degrees    Right Left Right Left Right Left   Shoulder Internal Rotation 55 73 55 90               Hip External Rotation (0-50 )         Hip Internal Rotation (0-40 )                        Lower Extremity Strength:    Date: 10/28/22     LE strength/5 Right Left Right Left Right Left   Hip Flexion (L1-3) 5 5       Hip Extension (L5-S1)         Hip Abduction (L4-5)         Hip Adduction (L2-3)         Hip External Rotation         Hip Internal Rotation         Knee Extension (L3-4) 4+ 4+       Knee Flexion 4- 3+       Ankle Dorsiflexion (L4-5)         Great Toe Extension (L5)         Ankle Plantar flexion (S1)         Abdominals               Special Tests:    Special Tests Right Left        Hruska Add drop test   - +   Hruska Add lift test        Trey Extension Drop Test               Treatment Today       Date 2/17/23 1/6/23 12/2/22 11/18/22 10/28/22   Exercise        Supine 90/90 hip lift with hip shift B feet on box with cues x 3 B feet on box with cues 3 breaths x 3  B feet on box and wall with cues 3 breaths x 3 reps  B feet on box and wall with cues 3 breaths x 3 reps B feet on box with cues for 3 breaths x 3 reps   R S/L L Adductor Pullback  With yellow band 3 breaths x 3 with cues, then without band. Pt starting to have proper muscle facilitation without the band.  With yellow band 3 breaths x 3 with cues, then without band. Pt starting to have proper muscle facilitation without the band.  With yellow band 3 breaths x 5 with cues  3  Breaths x 3 reps with cues    Modified all 4 Belly Lift 3  breaths x 3 reps with cues                                                                                        Savita Hernandez, PT

## 2023-03-02 ENCOUNTER — HOSPITAL ENCOUNTER (OUTPATIENT)
Dept: PHYSICAL THERAPY | Facility: REHABILITATION | Age: 58
Discharge: HOME | End: 2023-03-02
Payer: COMMERCIAL

## 2023-03-02 DIAGNOSIS — M54.2 CERVICAL PAIN: ICD-10-CM

## 2023-03-02 DIAGNOSIS — M54.6 THORACIC SPINE PAIN: ICD-10-CM

## 2023-03-02 DIAGNOSIS — M79.18 MYOFASCIAL PAIN: Primary | ICD-10-CM

## 2023-03-02 PROCEDURE — 97110 THERAPEUTIC EXERCISES: CPT | Mod: GP | Performed by: PHYSICAL THERAPIST

## 2023-03-02 PROCEDURE — 97140 MANUAL THERAPY 1/> REGIONS: CPT | Mod: GP | Performed by: PHYSICAL THERAPIST

## 2023-03-17 ENCOUNTER — HOSPITAL ENCOUNTER (OUTPATIENT)
Dept: PHYSICAL THERAPY | Facility: REHABILITATION | Age: 58
Discharge: HOME | End: 2023-03-17
Payer: COMMERCIAL

## 2023-03-17 DIAGNOSIS — M79.18 MYOFASCIAL PAIN: Primary | ICD-10-CM

## 2023-03-17 DIAGNOSIS — M54.2 CERVICAL PAIN: ICD-10-CM

## 2023-03-17 DIAGNOSIS — M54.6 THORACIC SPINE PAIN: ICD-10-CM

## 2023-03-17 PROCEDURE — 97140 MANUAL THERAPY 1/> REGIONS: CPT | Mod: GP | Performed by: PHYSICAL THERAPIST

## 2023-03-24 ENCOUNTER — HOSPITAL ENCOUNTER (OUTPATIENT)
Dept: PHYSICAL THERAPY | Facility: REHABILITATION | Age: 58
Discharge: HOME | End: 2023-03-24
Payer: COMMERCIAL

## 2023-03-24 DIAGNOSIS — M79.18 MYOFASCIAL PAIN: Primary | ICD-10-CM

## 2023-03-24 DIAGNOSIS — M54.2 CERVICAL PAIN: ICD-10-CM

## 2023-03-24 DIAGNOSIS — M54.6 THORACIC SPINE PAIN: ICD-10-CM

## 2023-03-24 PROCEDURE — 97110 THERAPEUTIC EXERCISES: CPT | Mod: GP | Performed by: PHYSICAL THERAPIST

## 2023-03-24 PROCEDURE — 97140 MANUAL THERAPY 1/> REGIONS: CPT | Mod: GP | Performed by: PHYSICAL THERAPIST

## 2023-03-24 NOTE — PROGRESS NOTES
[facility]     * For full daily note please see Daily Doc Flow sheet    Patient Name: Francisca Batista                 Objective:     Note: Items left blank indicates the item was not performed or not indicated at the time of the assessment.         ROM  Date: 10/28/22 11/18/22     PROM in degrees PROM in degrees PROM in degrees    Right Left Right Left Right Left   Shoulder Internal Rotation 55 73 55 90               Hip External Rotation (0-50 )         Hip Internal Rotation (0-40 )                        Lower Extremity Strength:    Date: 10/28/22     LE strength/5 Right Left Right Left Right Left   Hip Flexion (L1-3) 5 5       Hip Extension (L5-S1)         Hip Abduction (L4-5)         Hip Adduction (L2-3)         Hip External Rotation         Hip Internal Rotation         Knee Extension (L3-4) 4+ 4+       Knee Flexion 4- 3+       Ankle Dorsiflexion (L4-5)         Great Toe Extension (L5)         Ankle Plantar flexion (S1)         Abdominals               Special Tests:    Special Tests Right Left        Hruska Add drop test   - +   Hruska Add lift test        Trey Extension Drop Test               Treatment Today       Date 3/24/23 2/17/23 1/6/23 12/2/22 11/18/22 10/28/22   Exercise         Supine 90/90 hip lift with hip shift B feet on box with cues x 3 B feet on box with cues x 3 B feet on box with cues 3 breaths x 3  B feet on box and wall with cues 3 breaths x 3 reps  B feet on box and wall with cues 3 breaths x 3 reps B feet on box with cues for 3 breaths x 3 reps   R S/L L Adductor Pullback   With yellow band 3 breaths x 3 with cues, then without band. Pt starting to have proper muscle facilitation without the band.  With yellow band 3 breaths x 3 with cues, then without band. Pt starting to have proper muscle facilitation without the band.  With yellow band 3 breaths x 5 with cues  3  Breaths x 3 reps with  cues    Modified all 4 Belly Lift 3 breaths x 3 with cues  Plus detailed education and discussion about breathing  3 breaths x 3 reps with cues                                                                                                 Savita Hernandez, PT

## 2023-03-27 ENCOUNTER — HOSPITAL ENCOUNTER (OUTPATIENT)
Dept: PHYSICAL THERAPY | Facility: REHABILITATION | Age: 58
Discharge: HOME | End: 2023-03-27
Payer: COMMERCIAL

## 2023-03-27 DIAGNOSIS — M54.6 THORACIC SPINE PAIN: ICD-10-CM

## 2023-03-27 DIAGNOSIS — M79.18 MYOFASCIAL PAIN: Primary | ICD-10-CM

## 2023-03-27 DIAGNOSIS — M54.2 CERVICAL PAIN: ICD-10-CM

## 2023-03-27 PROCEDURE — 97140 MANUAL THERAPY 1/> REGIONS: CPT | Mod: GP | Performed by: PHYSICAL THERAPIST

## 2023-04-10 ENCOUNTER — HOSPITAL ENCOUNTER (OUTPATIENT)
Dept: PHYSICAL THERAPY | Facility: REHABILITATION | Age: 58
Discharge: HOME | End: 2023-04-10
Payer: COMMERCIAL

## 2023-04-10 DIAGNOSIS — M54.6 THORACIC SPINE PAIN: ICD-10-CM

## 2023-04-10 DIAGNOSIS — M54.2 CERVICAL PAIN: ICD-10-CM

## 2023-04-10 DIAGNOSIS — M79.18 MYOFASCIAL PAIN: Primary | ICD-10-CM

## 2023-04-10 PROCEDURE — 97140 MANUAL THERAPY 1/> REGIONS: CPT | Mod: GP | Performed by: PHYSICAL THERAPIST

## 2023-04-15 ENCOUNTER — HEALTH MAINTENANCE LETTER (OUTPATIENT)
Age: 58
End: 2023-04-15

## 2023-04-21 ENCOUNTER — HOSPITAL ENCOUNTER (OUTPATIENT)
Dept: PHYSICAL THERAPY | Facility: REHABILITATION | Age: 58
Discharge: HOME | End: 2023-04-21
Payer: COMMERCIAL

## 2023-04-21 DIAGNOSIS — M79.18 MYOFASCIAL PAIN: Primary | ICD-10-CM

## 2023-04-21 DIAGNOSIS — M54.2 CERVICAL PAIN: ICD-10-CM

## 2023-04-21 DIAGNOSIS — M54.6 THORACIC SPINE PAIN: ICD-10-CM

## 2023-04-21 PROCEDURE — 97140 MANUAL THERAPY 1/> REGIONS: CPT | Mod: GP | Performed by: PHYSICAL THERAPIST

## 2023-04-21 PROCEDURE — 97110 THERAPEUTIC EXERCISES: CPT | Mod: GP | Performed by: PHYSICAL THERAPIST

## 2023-04-21 NOTE — PROGRESS NOTES
[facility]    OUTPATIENT PHYSICAL THERAPY  PLAN OF TREATMENT FOR OUTPATIENT REHABILITATION AND PROGRESS NOTE           Patient's Last Name, First Name, Francisca Osullivan Date of Birth  1965   Provider's Name  [facility] Medical Record No.  [id number]    Onset Date  12/9/21 Start of Care Date  2/7/22   Type:     _X_PT   ___OT   ___SLP Medical Diagnosis  postconcussive syndrome, chronic pain   PT Diagnosis  Chronic neck and back Pain, decreased strength  Plan of Treatment  Frequency/Duration: 1-2 times per week x 12 more weeks.  Certification date from 4/21/23 to 7/18/23     Goals:  Goal Identifier 1   Goal Description Pt will be able to improve C-rot to >65 deg to improve daily function in 12 weeks.   Target Date     Date Met      Progress (detail required for progress note): C-rot: 63/65 - GOAL MET. Will inrease to >65 deg B     Goal Identifier 2   Goal Description Pt will decrease NDI by >10 points to show improved function in 12 weeks.    Target Date     Date Met   8/2/22   Progress (detail required for progress note): NDI: 8/2/22: 58 (eval 74 on 2/7/22) GOAL MET and goal will now continue with new score. .     Goal Identifier 3   Goal Description Pt will decrease pain levels from 8-10/10 to 6-8/10 with ADLs to show improved function in 12 weeks.    Target Date  7/18/23   Date Met      Progress (detail required for progress note): Pain range: 5-7/10 in the body, 6-9/10 with HA but more 6's.  Pt is progressing.  Continue goal.     Goal Identifier  4 New Goal:   Goal Description  Pt will be able to increase her ability to perform household tasks like making meals without pain limiting her in 12 weeks.     Target Date  7/18/23   Date Met      Progress (detail required for progress note):       Goal Identifier     Goal Description     Target Date     Date Met       Progress (detail required for progress note):       Goal Identifier     Goal Description     Target Date     Date Met      Progress (detail required for progress note):       Goal Identifier     Goal Description     Target Date     Date Met      Progress (detail required for progress note):       Goal Identifier     Goal Description     Target Date     Date Met      Progress (detail required for progress note):             Beginning/End Dates of Progress Note Reporting Period:  1/31/23 to 4/21/23    Progress Toward Goals:   Progress this reporting period: Francisca is making nice progress objectively with increased cervical rotation and side bending range of motion as well as shoulder IR PROM.  She also is feeling stronger with her exercises and demonstrating better ability to perform them and facilitate the proper muscles.      Client Self (Subjective) Report for Progress Note Reporting Period: Pt reports she feels she is doing better with her Postural Restoration PT and that she now has the right glasses.  She states it has been a proecess of figuring out her glasses.  She also reports stress with an upcoming MRI for her brain because of her severe response to noise of the machine.      Objective Measurements:   Objective Measure: MS, neural fascial tensions.     Objective Measure: Shoulder PROM IR  Details: R 70 degrees, L 80 degrees, B increased to 90 after FARAZ manual techniqes today.                                    I CERTIFY THE NEED FOR THESE SERVICES FURNISHED UNDER        THIS PLAN OF TREATMENT AND WHILE UNDER MY CARE     (Physician co-signature of this document indicates review and certification of the therapy plan).                Referring Provider: Nidhi Hernandez, PT     04/21/23 1100   Signing Clinician's Name / Credentials   Signing clinician's name / credentials Savita Sarah PT   Session Number   Session Number 34   Progress Note/Recertification    Recertification Due Date 04/30/23   Adult Goals   PT Ortho Eval Goals 1;2;3   Ortho Goal 1   Goal Identifier 1   Goal Description Pt will be able to improve C-rot to >65 deg to improve daily function in 12 weeks.   Goal Progress C-rot: 63/65 - GOAL MET. Will inrease to >65 deg B   Ortho Goal 2   Goal Identifier 2   Goal Description Pt will decrease NDI by >10 points to show improved function in 12 weeks.    Goal Progress NDI: 8/2/22: 58 (eval 74 on 2/7/22) GOAL MET and goal will now continue with new score. .   Ortho Goal 3   Goal Identifier 3   Goal Description Pt will decrease pain levels from 8-10/10 to 6-8/10 with ADLs to show improved function in 12 weeks.    Goal Progress Pain range: 5-7/10 in the body, 6-9/10 with HA but more 6's.   Subjective Report   Subjective Report Pt reports she feels she is doing better with her Postural Restoration PT and that she now has the right glasses.  She states it has been a proecess of figuring out her glasses.  She also reports stress with an upcoming MRI for her brain because of her severe response to noise of the machine.   Objective Measures   Objective Measures Objective Measure 1   Objective Measure 1   Objective Measure MS, neural fascial tensions.   Objective Measure 2   Objective Measure Shoulder PROM IR   Details R 70 degrees, L 80 degrees, B increased to 90 after FARAZ manual techniqes today.   Treatment Interventions   Interventions Therapeutic Procedure/Exercise;Neuromuscular Re-education;Manual Therapy   Therapeutic Procedure/exercise   Therapeutic Procedures: strength, endurance, ROM, flexibillity minutes ([id number]) 30   Skilled Intervention FARAZ exercises   Patient Response Pt felt good doing the exercise today. No negative resonse.   Treatment Detail See Note for exercise flow sheet   Progress Required less cues   Manual Therapy   Manual Therapy: Mobilization, MFR, MLD, friction massage minutes (68181) 15   Skilled Intervention FARAZ Manual Techniques   Patient  Response Complete restoration of B shoulder PROM IR, Easier to breathe   Treatment Detail L AIC, Infraclavicular Pump, Lower Rib Depression   Progress Upper ribs are not up as much, better chest wall flexibility   Plan   Plan for next session Plan to continue with FARAZ progress and  manual therapy techniques  to reposition chest wall and get her ribs down to get her diaphragm in the proper position as well as decrease fascial tension/tone to normalize ROM/decrease inflammation/decrease mm tone and improve proprioception.   Total Session Time   Timed Code Treatment Minutes 30, 15   Total Treatment Time (sum of timed and untimed services) 45   Medicare Claim Information   PT Diagnosis chronic pain, chronic neck pain   Onset date of current episode/exacerbation 12/09/21   Certification date from 01/31/23

## 2023-05-08 ENCOUNTER — HOSPITAL ENCOUNTER (OUTPATIENT)
Dept: PHYSICAL THERAPY | Facility: REHABILITATION | Age: 58
Discharge: HOME | End: 2023-05-08
Payer: COMMERCIAL

## 2023-05-08 DIAGNOSIS — M54.2 CERVICAL PAIN: ICD-10-CM

## 2023-05-08 DIAGNOSIS — M54.6 THORACIC SPINE PAIN: ICD-10-CM

## 2023-05-08 DIAGNOSIS — M79.18 MYOFASCIAL PAIN: Primary | ICD-10-CM

## 2023-05-08 PROCEDURE — 97140 MANUAL THERAPY 1/> REGIONS: CPT | Mod: GP | Performed by: PHYSICAL THERAPIST

## 2023-05-22 ENCOUNTER — THERAPY VISIT (OUTPATIENT)
Dept: PHYSICAL THERAPY | Facility: REHABILITATION | Age: 58
End: 2023-05-22
Payer: COMMERCIAL

## 2023-05-22 DIAGNOSIS — M54.6 THORACIC SPINE PAIN: ICD-10-CM

## 2023-05-22 DIAGNOSIS — M54.2 CERVICAL PAIN: ICD-10-CM

## 2023-05-22 DIAGNOSIS — M79.18 MYOFASCIAL PAIN: Primary | ICD-10-CM

## 2023-05-22 PROCEDURE — 97140 MANUAL THERAPY 1/> REGIONS: CPT | Mod: GP | Performed by: PHYSICAL THERAPIST

## 2023-05-25 ENCOUNTER — THERAPY VISIT (OUTPATIENT)
Dept: PHYSICAL THERAPY | Facility: REHABILITATION | Age: 58
End: 2023-05-25
Payer: COMMERCIAL

## 2023-05-25 DIAGNOSIS — M54.2 CERVICAL PAIN: ICD-10-CM

## 2023-05-25 DIAGNOSIS — M54.6 THORACIC SPINE PAIN: ICD-10-CM

## 2023-05-25 DIAGNOSIS — M79.18 MYOFASCIAL PAIN: Primary | ICD-10-CM

## 2023-05-25 PROCEDURE — 97140 MANUAL THERAPY 1/> REGIONS: CPT | Mod: GP | Performed by: PHYSICAL THERAPIST

## 2023-05-25 PROCEDURE — 97110 THERAPEUTIC EXERCISES: CPT | Mod: GP | Performed by: PHYSICAL THERAPIST

## 2023-06-05 ENCOUNTER — THERAPY VISIT (OUTPATIENT)
Dept: PHYSICAL THERAPY | Facility: REHABILITATION | Age: 58
End: 2023-06-05
Payer: COMMERCIAL

## 2023-06-05 DIAGNOSIS — M54.2 CERVICAL PAIN: ICD-10-CM

## 2023-06-05 DIAGNOSIS — M79.18 MYOFASCIAL PAIN: Primary | ICD-10-CM

## 2023-06-05 DIAGNOSIS — M54.6 THORACIC SPINE PAIN: ICD-10-CM

## 2023-06-05 PROCEDURE — 97140 MANUAL THERAPY 1/> REGIONS: CPT | Mod: GP | Performed by: PHYSICAL THERAPIST

## 2023-06-12 ENCOUNTER — THERAPY VISIT (OUTPATIENT)
Dept: PHYSICAL THERAPY | Facility: REHABILITATION | Age: 58
End: 2023-06-12
Payer: COMMERCIAL

## 2023-06-12 DIAGNOSIS — M54.2 CERVICAL PAIN: ICD-10-CM

## 2023-06-12 DIAGNOSIS — M54.6 THORACIC SPINE PAIN: ICD-10-CM

## 2023-06-12 DIAGNOSIS — M79.18 MYOFASCIAL PAIN: Primary | ICD-10-CM

## 2023-06-12 PROCEDURE — 97140 MANUAL THERAPY 1/> REGIONS: CPT | Mod: GP | Performed by: PHYSICAL THERAPIST

## 2023-07-10 ENCOUNTER — THERAPY VISIT (OUTPATIENT)
Dept: PHYSICAL THERAPY | Facility: REHABILITATION | Age: 58
End: 2023-07-10
Payer: COMMERCIAL

## 2023-07-10 DIAGNOSIS — M54.2 CERVICAL PAIN: ICD-10-CM

## 2023-07-10 DIAGNOSIS — M79.18 MYOFASCIAL PAIN: Primary | ICD-10-CM

## 2023-07-10 DIAGNOSIS — M54.6 THORACIC SPINE PAIN: ICD-10-CM

## 2023-07-10 PROCEDURE — 97140 MANUAL THERAPY 1/> REGIONS: CPT | Mod: GP | Performed by: PHYSICAL THERAPIST

## 2023-07-10 PROCEDURE — 97110 THERAPEUTIC EXERCISES: CPT | Mod: GP | Performed by: PHYSICAL THERAPIST

## 2023-07-17 ENCOUNTER — THERAPY VISIT (OUTPATIENT)
Dept: PHYSICAL THERAPY | Facility: REHABILITATION | Age: 58
End: 2023-07-17
Payer: COMMERCIAL

## 2023-07-17 DIAGNOSIS — M79.18 MYOFASCIAL PAIN: Primary | ICD-10-CM

## 2023-07-17 DIAGNOSIS — M54.2 CERVICAL PAIN: ICD-10-CM

## 2023-07-17 DIAGNOSIS — M54.6 THORACIC SPINE PAIN: ICD-10-CM

## 2023-07-17 PROCEDURE — 97140 MANUAL THERAPY 1/> REGIONS: CPT | Mod: GP | Performed by: PHYSICAL THERAPIST

## 2023-07-17 NOTE — PROGRESS NOTES
[facility]                                                                                   OUTPATIENT PHYSICAL THERAPY    PLAN OF TREATMENT FOR OUTPATIENT REHABILITATION   Patient's Last Name, First Name, Francisca Osullivan YOB: 1965   Provider's Name   [facility]   Medical Record No.  [id number]     Onset Date: 12/09/21  Start of Care Date: 02/07/22     Medical Diagnosis:  post-concussive syndrome, chronic pain      PT Treatment Diagnosis:  Chronic neck and back Pain, decreased strength Plan of Treatment  Frequency/Duration: 1x/week/ 90 days    Certification date from 07/17/23 to 10/15/23         See note for plan of treatment details and functional goals     JORDAN ODEN, PT                         I CERTIFY THE NEED FOR THESE SERVICES FURNISHED UNDER        THIS PLAN OF TREATMENT AND WHILE UNDER MY CARE     (Physician attestation of this document indicates review and certification of the therapy plan).                Referring Provider:  Christal Levy CNP      Initial Assessment  See Epic Evaluation- Start of Care Date: 02/07/22 07/17/23 0500   Appointment Info   Signing clinician's name / credentials Jordan Oden PT   Visits Used 40   Medical Diagnosis post-concussive syndrome, chronic pain   PT Tx Diagnosis Chronic neck and back Pain, decreased strength   Quick Adds Certification   Progress Note/Certification   Start of Care Date 02/07/22   Onset of illness/injury or Date of Surgery 12/09/21   Therapy Frequency 1x/week   Predicted Duration 90 days   Certification date from 07/17/23   Certification date to 10/15/23   Progress Note Completed Date 04/21/23   PT Goal 1   Goal Identifier 1   Goal Description Pt will be able to improve C-rot to >65 deg to improve daily function in 12 weeks.   Goal Progress C-rot: 57/65   PT Goal 2   Goal Identifier 2   Goal Description Pt will decrease NDI by >10 points to  show improved function in 12 weeks.   Goal Progress NDI: 8/2/22: 58 (eval 74 on 2/7/22) GOAL MET and goal will now continue with new score. .   PT Goal 3   Goal Identifier 3   Goal Description Pt will decrease pain levels from 8-10/10 to 6-8/10 with ADLs to show improved function in 12 weeks.   Goal Progress Pain range: 8-9/10 range consistently since her MRI   Subjective Report   Subjective Report From the last treatments she had with this PT she had 2 really good weeks. She has also had a chiro visit since her last PT visit. There is very slow improvement in the last week. She hasn't been able to do the pool due to the pain she has had since her MRI/intubation. The pain is more L>R in her lower extremities and her shoulder. She is also really sore in the throat and swallowing. She has also been getting intense HA's.   Objective Measures   Objective Measures Objective Measure 1   Objective Measure 1   Objective Measure ADP fascial tensions.   Objective Measure 2   Objective Measure Shoulder PROM IR   Details B 90 degress, this is improved!   Objective Measure 3   Objective Measure Hruska Add Drop Test   Details R Negative, L Positive   Objective Measure 4   Objective Measure Hamstring Strength   Details R 4+/5,  L 3+/5   Treatment Interventions (PT)   Interventions Therapeutic Procedure/Exercise;Neuromuscular Re-education;Manual Therapy   Therapeutic Procedure/Exercise   Therapeutic Procedures Ther Proc 2;Ther Proc 3;Ther Proc 4;Ther Proc 5   Ther Proc 1 Supine 90/90 hip lift with hip shift   Ther Proc 1 - Details B feet on wall and box.  Then trial with repeatedly taking right foot on and off and did ok.   Ther Proc 2 - Details Sidelying Add Pull Back   Ther Proc 3 L 3 breaths   Ther Proc 3 - Details Sidelying Right Glute Max   Ther Proc 4 - Details Modified all 4 Belly Lift   Skilled Intervention FARAZ exercises   Patient Response/Progress Pt got new New Balance tennis shoes from the shoe recommendation list and  loves them.   Manual Therapy   Manual Therapy: Mobilization, MFR, MLD, friction massage minutes (30652) 55   Skilled Intervention Fascial release using Strain-Counterstrain of B cervical/upper thor AEPI-ADP, B cervical PEPI-ADP, B cerv post-ADP   Plan   Plan for next session Plan to continue with FARAZ progress and  manual therapy techniques  to reposition chest wall and get her ribs down to get her diaphragm in the proper position as well as decrease fascial tension/tone to normalize ROM/decrease inflammation/decrease mm tone and improve proprioception.   Comments   Comments She had been making great progress towards her goals until recently wtih an exacerbation of her sx following an MRI with intubation. She did have good improvement in her cerv mm tone which should improve HA's/swallowing and overall central sensitization. She does remain appropriate for skilled PT to reach all stated goals.   Total Session Time   Timed Code Treatment Minutes 55   Total Treatment Time (sum of timed and untimed services) 55   Medicare Claim Information   Medical Diagnosis postconcussive syndrome, chronic pain   PT Diagnosis chronic pain, chronic neck pain   Start of Care Date 02/07/22   Onset date of current episode/exacerbation 12/09/21   Certification date from 04/21/23   Ortho Goal 1   Goal Identifier 1   Goal Description Pt will be able to improve C-rot to >65 deg to improve daily function in 12 weeks.   Goal Progress C-rot: 63/65 - GOAL MET. Will inrease to >65 deg B   Ortho Goal 2   Goal Identifier 2   Goal Description Pt will decrease NDI by >10 points to show improved function in 12 weeks.    Goal Progress NDI: 8/2/22: 58 (eval 74 on 2/7/22) GOAL MET and goal will now continue with new score. .   Ortho Goal 3   Goal Identifier 3   Goal Description Pt will decrease pain levels from 8-10/10 to 6-8/10 with ADLs to show improved function in 12 weeks.    Goal Progress Pain range: 5-7/10 in the body, 6-9/10 with HA but more 6's.    Session Number   Authorization status Did Medicare re-cert.  Next one due 7/18/23

## 2023-08-09 ENCOUNTER — THERAPY VISIT (OUTPATIENT)
Dept: PHYSICAL THERAPY | Facility: REHABILITATION | Age: 58
End: 2023-08-09
Payer: COMMERCIAL

## 2023-08-09 DIAGNOSIS — M79.18 MYOFASCIAL PAIN: Primary | ICD-10-CM

## 2023-08-09 DIAGNOSIS — M54.6 THORACIC SPINE PAIN: ICD-10-CM

## 2023-08-09 DIAGNOSIS — M54.2 CERVICAL PAIN: ICD-10-CM

## 2023-08-09 PROCEDURE — 97140 MANUAL THERAPY 1/> REGIONS: CPT | Mod: GP | Performed by: PHYSICAL THERAPIST

## 2023-08-14 ENCOUNTER — THERAPY VISIT (OUTPATIENT)
Dept: PHYSICAL THERAPY | Facility: REHABILITATION | Age: 58
End: 2023-08-14
Payer: COMMERCIAL

## 2023-08-14 DIAGNOSIS — M54.2 CERVICAL PAIN: ICD-10-CM

## 2023-08-14 DIAGNOSIS — M54.6 THORACIC SPINE PAIN: ICD-10-CM

## 2023-08-14 DIAGNOSIS — M79.18 MYOFASCIAL PAIN: Primary | ICD-10-CM

## 2023-08-14 PROCEDURE — 97140 MANUAL THERAPY 1/> REGIONS: CPT | Mod: GP | Performed by: PHYSICAL THERAPIST

## 2023-08-16 ENCOUNTER — THERAPY VISIT (OUTPATIENT)
Dept: PHYSICAL THERAPY | Facility: REHABILITATION | Age: 58
End: 2023-08-16
Payer: COMMERCIAL

## 2023-08-16 DIAGNOSIS — M79.18 MYOFASCIAL PAIN: Primary | ICD-10-CM

## 2023-08-16 DIAGNOSIS — M54.6 THORACIC SPINE PAIN: ICD-10-CM

## 2023-08-16 DIAGNOSIS — M54.2 CERVICAL PAIN: ICD-10-CM

## 2023-08-16 PROCEDURE — 97110 THERAPEUTIC EXERCISES: CPT | Mod: GP | Performed by: PHYSICAL THERAPIST

## 2023-08-16 PROCEDURE — 97140 MANUAL THERAPY 1/> REGIONS: CPT | Mod: GP | Performed by: PHYSICAL THERAPIST

## 2023-08-28 ENCOUNTER — THERAPY VISIT (OUTPATIENT)
Dept: PHYSICAL THERAPY | Facility: REHABILITATION | Age: 58
End: 2023-08-28
Payer: COMMERCIAL

## 2023-08-28 DIAGNOSIS — M79.18 MYOFASCIAL PAIN: Primary | ICD-10-CM

## 2023-08-28 DIAGNOSIS — M54.2 CERVICAL PAIN: ICD-10-CM

## 2023-08-28 DIAGNOSIS — M54.6 THORACIC SPINE PAIN: ICD-10-CM

## 2023-08-28 PROCEDURE — 97140 MANUAL THERAPY 1/> REGIONS: CPT | Mod: GP | Performed by: PHYSICAL THERAPIST

## 2023-09-08 ENCOUNTER — THERAPY VISIT (OUTPATIENT)
Dept: PHYSICAL THERAPY | Facility: REHABILITATION | Age: 58
End: 2023-09-08
Payer: COMMERCIAL

## 2023-09-08 DIAGNOSIS — M54.6 THORACIC SPINE PAIN: ICD-10-CM

## 2023-09-08 DIAGNOSIS — M54.2 CERVICAL PAIN: Primary | ICD-10-CM

## 2023-09-08 DIAGNOSIS — M79.18 MYOFASCIAL PAIN: ICD-10-CM

## 2023-09-08 PROCEDURE — 97110 THERAPEUTIC EXERCISES: CPT | Mod: GP | Performed by: PHYSICAL THERAPIST

## 2023-09-08 PROCEDURE — 97140 MANUAL THERAPY 1/> REGIONS: CPT | Mod: GP | Performed by: PHYSICAL THERAPIST

## 2023-09-11 ENCOUNTER — THERAPY VISIT (OUTPATIENT)
Dept: PHYSICAL THERAPY | Facility: REHABILITATION | Age: 58
End: 2023-09-11
Payer: COMMERCIAL

## 2023-09-11 DIAGNOSIS — M54.2 CERVICAL PAIN: Primary | ICD-10-CM

## 2023-09-11 DIAGNOSIS — M79.18 MYOFASCIAL PAIN: ICD-10-CM

## 2023-09-11 DIAGNOSIS — M54.6 THORACIC SPINE PAIN: ICD-10-CM

## 2023-09-11 PROCEDURE — 97140 MANUAL THERAPY 1/> REGIONS: CPT | Mod: GP | Performed by: PHYSICAL THERAPIST

## 2023-09-25 ENCOUNTER — THERAPY VISIT (OUTPATIENT)
Dept: PHYSICAL THERAPY | Facility: REHABILITATION | Age: 58
End: 2023-09-25
Payer: COMMERCIAL

## 2023-09-25 DIAGNOSIS — M54.2 CERVICAL PAIN: Primary | ICD-10-CM

## 2023-09-25 DIAGNOSIS — M79.18 MYOFASCIAL PAIN: ICD-10-CM

## 2023-09-25 DIAGNOSIS — M54.6 THORACIC SPINE PAIN: ICD-10-CM

## 2023-09-25 PROCEDURE — 97140 MANUAL THERAPY 1/> REGIONS: CPT | Mod: GP | Performed by: PHYSICAL THERAPIST

## 2023-11-25 ENCOUNTER — HEALTH MAINTENANCE LETTER (OUTPATIENT)
Age: 58
End: 2023-11-25

## 2024-12-29 ENCOUNTER — HEALTH MAINTENANCE LETTER (OUTPATIENT)
Age: 59
End: 2024-12-29

## 2025-03-02 ENCOUNTER — HEALTH MAINTENANCE LETTER (OUTPATIENT)
Age: 60
End: 2025-03-02